# Patient Record
Sex: MALE | URBAN - METROPOLITAN AREA
[De-identification: names, ages, dates, MRNs, and addresses within clinical notes are randomized per-mention and may not be internally consistent; named-entity substitution may affect disease eponyms.]

---

## 2023-01-12 ENCOUNTER — OFFICE VISIT (OUTPATIENT)
Dept: URBAN - METROPOLITAN AREA CLINIC 33 | Facility: CLINIC | Age: 88
End: 2023-01-12
Payer: MEDICARE

## 2023-01-12 DIAGNOSIS — Z96.1 PRESENCE OF INTRAOCULAR LENS: ICD-10-CM

## 2023-01-12 DIAGNOSIS — H52.4 PRESBYOPIA: ICD-10-CM

## 2023-01-12 DIAGNOSIS — H43.813 VITREOUS DEGENERATION, BILATERAL: ICD-10-CM

## 2023-01-12 DIAGNOSIS — E11.9 TYPE 2 DIABETES MELLITUS W/O COMPLICATION: Primary | ICD-10-CM

## 2023-01-12 PROCEDURE — 92004 COMPRE OPH EXAM NEW PT 1/>: CPT

## 2023-01-12 ASSESSMENT — KERATOMETRY
OD: 43.00
OS: 43.75

## 2023-01-12 ASSESSMENT — INTRAOCULAR PRESSURE
OS: 11
OD: 11

## 2023-01-12 ASSESSMENT — VISUAL ACUITY
OS: 20/20
OD: 20/20

## 2023-01-12 NOTE — IMPRESSION/PLAN
Impression: Type 2 diabetes mellitus w/o complication: J28.8. Plan: Patient educated on all ocular findings. Stressed importance of following up with primary care physician, tight control over blood sugar, maintaining healthy diet, strict adherence to medication, and exercise. Patient is advised that a yearly ophthalmic exam is recommended. Return sooner if any new symptoms.

## 2023-02-15 ENCOUNTER — OFFICE VISIT (OUTPATIENT)
Dept: URBAN - METROPOLITAN AREA CLINIC 33 | Facility: CLINIC | Age: 88
End: 2023-02-15
Payer: MEDICARE

## 2023-02-15 DIAGNOSIS — H52.4 PRESBYOPIA: Primary | ICD-10-CM

## 2023-02-15 PROCEDURE — V2799 MISC VISION ITEM OR SERVICE: HCPCS
